# Patient Record
Sex: MALE | Race: WHITE
[De-identification: names, ages, dates, MRNs, and addresses within clinical notes are randomized per-mention and may not be internally consistent; named-entity substitution may affect disease eponyms.]

---

## 2017-05-29 ENCOUNTER — HOSPITAL ENCOUNTER (EMERGENCY)
Dept: HOSPITAL 17 - NEPC | Age: 52
Discharge: HOME | End: 2017-05-29
Payer: COMMERCIAL

## 2017-05-29 VITALS — DIASTOLIC BLOOD PRESSURE: 95 MMHG | SYSTOLIC BLOOD PRESSURE: 192 MMHG

## 2017-05-29 VITALS
DIASTOLIC BLOOD PRESSURE: 111 MMHG | TEMPERATURE: 97.5 F | RESPIRATION RATE: 18 BRPM | SYSTOLIC BLOOD PRESSURE: 177 MMHG | HEART RATE: 72 BPM | OXYGEN SATURATION: 100 %

## 2017-05-29 VITALS — BODY MASS INDEX: 22.49 KG/M2 | WEIGHT: 143.3 LBS | HEIGHT: 67 IN

## 2017-05-29 DIAGNOSIS — E78.5: ICD-10-CM

## 2017-05-29 DIAGNOSIS — J44.9: ICD-10-CM

## 2017-05-29 DIAGNOSIS — Z79.82: ICD-10-CM

## 2017-05-29 DIAGNOSIS — I10: ICD-10-CM

## 2017-05-29 DIAGNOSIS — Q85.00: ICD-10-CM

## 2017-05-29 DIAGNOSIS — G89.29: ICD-10-CM

## 2017-05-29 DIAGNOSIS — L03.111: Primary | ICD-10-CM

## 2017-05-29 PROCEDURE — 96372 THER/PROPH/DIAG INJ SC/IM: CPT

## 2017-05-29 PROCEDURE — 99284 EMERGENCY DEPT VISIT MOD MDM: CPT

## 2017-05-29 NOTE — PD
HPI


Chief Complaint:  Skin Problem


Time Seen by Provider:  07:02


Travel History


International Travel<30 days:  No


Contact w/Intl Traveler<30days:  No


Traveled to known affect area:  No





History of Present Illness


HPI


52-year-old male presents emergency department complaining of pain and swelling 

under his right arm for the past 2-3 days, worsening.  There is erythema 

redness warmth and tenderness.  His had one abscess there about a year or so 

ago.  It had multiple skin infections before.  No fevers chills, nausea vomiting

, or other evidence of systemic infection.  No other complaints.





History


Past Medical History


*** Narrative Medical


Chronic back pain


COPD


Neurofibromatosis


Hypertension 


hyperlipidemia


CAD





Social History


Alcohol Use:  No (HX OF USE)


Tobacco Use:  Yes (1 PACK DAILY)





Allergies-Medications


(Allergen,Severity, Reaction):  


Coded Allergies:  


     Codeine (Verified  Adverse Reaction, Severe, NAUSEA & VOMITING, 1/13/16)


     *MDRO Multi-Drug Resistant Organism (Verified  Adverse Reaction, Unknown, 1 /13/16)


 MRSA 2007 (finger), 1/2008 (hand), 3/2008 (finger), 11/2011


 (scrotum), 10/2014 (arm), 10/2015 (arm)


Reported Meds & Prescriptions





Reported Meds & Active Scripts


Active


Levothyroxine 25 mcg (Levothyroxine Sodium) 25 Mcg Tab 25 Mcg PO DAILY 


Metoprolol Tartrate 25 mg (Metoprolol Tartrate) 25 Mg Tab 25 Mg PO Q12HR 


Lipitor 10 mg tab (Atorvastatin) 10 Mg Tab 10 Mg PO DAILY 


Gabapentin 600 Mg Tab 600 Mg PO TID 


Ventolin Hfa (Albuterol Sulfate) 60 Puff/8 Gm Aero 2 Puff INH Q4H PRN 30 Days


Reported


Aspir-81 (Aspirin) 81 Mg Tab 81 Mg PO BID 








Review of Systems


Except as stated in HPI:  all other systems reviewed are Neg





Physical Exam


Narrative


GENERAL: Well-appearing 52-year-old man, no acute distress.


SKIN: Warm and dry.


CARDIOVASCULAR: Warm and well perfused.


RESPIRATORY: Normal rate and effort.


MUSCULOSKELETAL: Focus examination the right axilla reveals some erythema 

fullness to the right axilla.  There is 2 pustules.  There is some induration 

but no significant organization, or fluctuance.


NEUROLOGICAL: Awake and alert.  No gross deficits.





Data


Data


Last Documented VS





Vital Signs








  Date Time  Temp Pulse Resp B/P Pulse Ox O2 Delivery O2 Flow Rate FiO2


 


5/29/17 06:51   18     


 


5/29/17 06:35 97.5 72  177/111 100   








Orders





 Hydromorphone Pf Inj (Dilaudid Pf Inj) (5/29/17 07:15)


Lidocai-Epi 1%-1:100,000 Inj (Xylocaine- (5/29/17 07:15)


Ed Poc Ultrasound (5/29/17 )


Sulfamet-Trimeth Ds 800-160 Mg (Bactrim (5/29/17 08:00)








MDM


Medical Decision Making


Medical Screen Exam Complete:  Yes


Emergency Medical Condition:  Yes


Differential Diagnosis


Abscess, phlegmon, cellulitis, other


Narrative Course


Medical decision making





51-year-old male presents emergency Department with soft tissue infection under 

his right axilla.  There is no clear fluctuance.  Examined under ultrasound 

shows no drainable collections.  Patient will be placed on antibiotics, 

reassess it progresses.





Procedures


**Procedure Narrative**


Point of care ultrasound: Focused soft tissue ultrasounds perform a me at the 

bedside for the purpose of evaluating for drainable fluid collections.  No 

drainable fluid collections were identified in the right axilla.





Diagnosis





 Primary Impression:  


 Cellulitis of right axilla


Patient Instructions:  Narcotic given in the ED





***Additional Instructions:


Take antibiotics as prescribed.





Follow-up with your primary doctor in 2-3 days.





Return to the emergency department for any new or worsening symptoms.


***Med/Other Pt SpecificInfo:  Prescription(s) given


Scripts


Sulfamethoxazole-Trimethoprim (Bactrim DS)800-160 Mg Tab1 Tab PO BID  7 Days


   Prov:Justin Collazo MD         5/29/17


Disposition:  01 DISCHARGE HOME


Condition:  Stable








Justin Collazo MD May 29, 2017 07:54

## 2017-09-04 ENCOUNTER — HOSPITAL ENCOUNTER (INPATIENT)
Dept: HOSPITAL 17 - NEPE | Age: 52
LOS: 2 days | Discharge: HOME | DRG: 682 | End: 2017-09-06
Attending: HOSPITALIST | Admitting: HOSPITALIST
Payer: MEDICAID

## 2017-09-04 VITALS
DIASTOLIC BLOOD PRESSURE: 71 MMHG | HEART RATE: 72 BPM | TEMPERATURE: 96.4 F | OXYGEN SATURATION: 95 % | RESPIRATION RATE: 16 BRPM | SYSTOLIC BLOOD PRESSURE: 147 MMHG

## 2017-09-04 VITALS
DIASTOLIC BLOOD PRESSURE: 82 MMHG | HEART RATE: 70 BPM | TEMPERATURE: 98.4 F | SYSTOLIC BLOOD PRESSURE: 134 MMHG | OXYGEN SATURATION: 96 % | RESPIRATION RATE: 18 BRPM

## 2017-09-04 VITALS
SYSTOLIC BLOOD PRESSURE: 118 MMHG | TEMPERATURE: 98 F | HEART RATE: 77 BPM | DIASTOLIC BLOOD PRESSURE: 66 MMHG | RESPIRATION RATE: 28 BRPM

## 2017-09-04 VITALS
DIASTOLIC BLOOD PRESSURE: 60 MMHG | TEMPERATURE: 98.1 F | SYSTOLIC BLOOD PRESSURE: 117 MMHG | HEART RATE: 86 BPM | RESPIRATION RATE: 28 BRPM | OXYGEN SATURATION: 89 %

## 2017-09-04 VITALS — WEIGHT: 163.58 LBS | BODY MASS INDEX: 26.29 KG/M2 | HEIGHT: 66 IN

## 2017-09-04 VITALS — OXYGEN SATURATION: 100 %

## 2017-09-04 VITALS — OXYGEN SATURATION: 97 %

## 2017-09-04 DIAGNOSIS — F12.10: ICD-10-CM

## 2017-09-04 DIAGNOSIS — T67.5XXA: ICD-10-CM

## 2017-09-04 DIAGNOSIS — K21.9: ICD-10-CM

## 2017-09-04 DIAGNOSIS — F17.210: ICD-10-CM

## 2017-09-04 DIAGNOSIS — E87.3: ICD-10-CM

## 2017-09-04 DIAGNOSIS — Z99.3: ICD-10-CM

## 2017-09-04 DIAGNOSIS — E86.0: ICD-10-CM

## 2017-09-04 DIAGNOSIS — M54.9: ICD-10-CM

## 2017-09-04 DIAGNOSIS — E03.9: ICD-10-CM

## 2017-09-04 DIAGNOSIS — J44.9: ICD-10-CM

## 2017-09-04 DIAGNOSIS — R11.2: ICD-10-CM

## 2017-09-04 DIAGNOSIS — I25.10: ICD-10-CM

## 2017-09-04 DIAGNOSIS — R27.8: ICD-10-CM

## 2017-09-04 DIAGNOSIS — G92: ICD-10-CM

## 2017-09-04 DIAGNOSIS — N17.9: Primary | ICD-10-CM

## 2017-09-04 DIAGNOSIS — E87.1: ICD-10-CM

## 2017-09-04 DIAGNOSIS — F32.9: ICD-10-CM

## 2017-09-04 DIAGNOSIS — F11.10: ICD-10-CM

## 2017-09-04 DIAGNOSIS — Q85.00: ICD-10-CM

## 2017-09-04 DIAGNOSIS — G89.29: ICD-10-CM

## 2017-09-04 DIAGNOSIS — I10: ICD-10-CM

## 2017-09-04 LAB
ALP SERPL-CCNC: 103 U/L (ref 45–117)
ALT SERPL-CCNC: 16 U/L (ref 12–78)
ANION GAP SERPL CALC-SCNC: 8 MEQ/L (ref 5–15)
APTT BLD: 28.8 SEC (ref 24.3–30.1)
AST SERPL-CCNC: 12 U/L (ref 15–37)
BASE EXCESS BLD CALC-SCNC: 8.9 MMOL/L (ref -2–2)
BASOPHILS # BLD AUTO: 0.1 TH/MM3 (ref 0–0.2)
BASOPHILS NFR BLD: 0.6 % (ref 0–2)
BENZODIAZEPINES PNL UR: 85 % (ref 90–100)
BILIRUB SERPL-MCNC: 0.4 MG/DL (ref 0.2–1)
BLOOD GAS CARBOXYHEMOGLOBIN: 8.4 % (ref 0–4)
BLOOD GAS HCO3: 33 MMOL/L (ref 22–26)
BLOOD GAS OXYGEN CONTENT: 13.2 VOL % (ref 12–20)
BLOOD GAS PCO2: 45 MMHG (ref 38–42)
BUN SERPL-MCNC: 78 MG/DL (ref 7–18)
CHLORIDE SERPL-SCNC: 86 MEQ/L (ref 98–107)
CK SERPL-CCNC: 28 U/L (ref 39–308)
COLOR UR: (no result)
COMMENT (UR): (no result)
CRITICAL VALUE: YES
CULTURE IF INDICATED: (no result)
DRAW SITE: (no result)
EOSINOPHIL # BLD: 0.3 TH/MM3 (ref 0–0.4)
EOSINOPHIL NFR BLD: 3.2 % (ref 0–4)
ERYTHROCYTE [DISTWIDTH] IN BLOOD BY AUTOMATED COUNT: 14 % (ref 11.6–17.2)
GFR SERPLBLD BASED ON 1.73 SQ M-ARVRAT: 10 ML/MIN (ref 89–?)
GLUCOSE UR STRIP-MCNC: (no result) MG/DL
HCO3 BLD-SCNC: 35.2 MEQ/L (ref 21–32)
HCT VFR BLD CALC: 36.8 % (ref 39–51)
HEMO FLAGS: (no result)
HGB UR QL STRIP: (no result)
INR PPP: 0.9 RATIO
KETONES UR STRIP-MCNC: (no result) MG/DL
LYMPHOCYTES # BLD AUTO: 2.9 TH/MM3 (ref 1–4.8)
LYMPHOCYTES NFR BLD AUTO: 27.5 % (ref 9–44)
MAGNESIUM SERPL-MCNC: 2.9 MG/DL (ref 1.5–2.5)
MCH RBC QN AUTO: 30.8 PG (ref 27–34)
MCHC RBC AUTO-ENTMCNC: 34.1 % (ref 32–36)
MCV RBC AUTO: 90.2 FL (ref 80–100)
METHGB MFR BLDA: 0.7 % (ref 0–2)
MONOCYTES NFR BLD: 14.2 % (ref 0–8)
NEUTROPHILS # BLD AUTO: 5.8 TH/MM3 (ref 1.8–7.7)
NEUTROPHILS NFR BLD AUTO: 54.5 % (ref 16–70)
NITRITE UR QL STRIP: (no result)
NUMBER OF ARTERIAL PUNCTURES: 1
OXYGEN DEVICE: (no result)
PLATELET # BLD: 325 TH/MM3 (ref 150–450)
PO2 BLD: 61 MMHG (ref 61–120)
POTASSIUM SERPL-SCNC: 4.5 MEQ/L (ref 3.5–5.1)
PROTHROMBIN TIME: 9.6 SEC (ref 9.8–11.6)
RBC # BLD AUTO: 4.08 MIL/MM3 (ref 4.5–5.9)
SALICYLATES SERPL-MCNC: 11.1 G/DL (ref 12–16)
SODIUM SERPL-SCNC: 129 MEQ/L (ref 136–145)
SP GR UR STRIP: 1.01 (ref 1–1.03)
STAT: YES
TEMP CORR TO: 98.6
WBC # BLD AUTO: 10.6 TH/MM3 (ref 4–11)

## 2017-09-04 PROCEDURE — 74176 CT ABD & PELVIS W/O CONTRAST: CPT

## 2017-09-04 PROCEDURE — 96361 HYDRATE IV INFUSION ADD-ON: CPT

## 2017-09-04 PROCEDURE — 82805 BLOOD GASES W/O2 SATURATION: CPT

## 2017-09-04 PROCEDURE — 80069 RENAL FUNCTION PANEL: CPT

## 2017-09-04 PROCEDURE — 85730 THROMBOPLASTIN TIME PARTIAL: CPT

## 2017-09-04 PROCEDURE — 82570 ASSAY OF URINE CREATININE: CPT

## 2017-09-04 PROCEDURE — 87040 BLOOD CULTURE FOR BACTERIA: CPT

## 2017-09-04 PROCEDURE — 80171 DRUG SCREEN QUANT GABAPENTIN: CPT

## 2017-09-04 PROCEDURE — 76775 US EXAM ABDO BACK WALL LIM: CPT

## 2017-09-04 PROCEDURE — 96375 TX/PRO/DX INJ NEW DRUG ADDON: CPT

## 2017-09-04 PROCEDURE — 85610 PROTHROMBIN TIME: CPT

## 2017-09-04 PROCEDURE — 83880 ASSAY OF NATRIURETIC PEPTIDE: CPT

## 2017-09-04 PROCEDURE — 94640 AIRWAY INHALATION TREATMENT: CPT

## 2017-09-04 PROCEDURE — 36600 WITHDRAWAL OF ARTERIAL BLOOD: CPT

## 2017-09-04 PROCEDURE — 71010: CPT

## 2017-09-04 PROCEDURE — 94664 DEMO&/EVAL PT USE INHALER: CPT

## 2017-09-04 PROCEDURE — 80053 COMPREHEN METABOLIC PANEL: CPT

## 2017-09-04 PROCEDURE — 93005 ELECTROCARDIOGRAM TRACING: CPT

## 2017-09-04 PROCEDURE — 80307 DRUG TEST PRSMV CHEM ANLYZR: CPT

## 2017-09-04 PROCEDURE — 82550 ASSAY OF CK (CPK): CPT

## 2017-09-04 PROCEDURE — 81001 URINALYSIS AUTO W/SCOPE: CPT

## 2017-09-04 PROCEDURE — 84300 ASSAY OF URINE SODIUM: CPT

## 2017-09-04 PROCEDURE — 96374 THER/PROPH/DIAG INJ IV PUSH: CPT

## 2017-09-04 PROCEDURE — 83735 ASSAY OF MAGNESIUM: CPT

## 2017-09-04 PROCEDURE — 83605 ASSAY OF LACTIC ACID: CPT

## 2017-09-04 PROCEDURE — 85025 COMPLETE CBC W/AUTO DIFF WBC: CPT

## 2017-09-04 PROCEDURE — 84484 ASSAY OF TROPONIN QUANT: CPT

## 2017-09-04 PROCEDURE — 84443 ASSAY THYROID STIM HORMONE: CPT

## 2017-09-04 PROCEDURE — 51702 INSERT TEMP BLADDER CATH: CPT

## 2017-09-04 RX ADMIN — HEPARIN SODIUM SCH UNITS: 10000 INJECTION, SOLUTION INTRAVENOUS; SUBCUTANEOUS at 16:50

## 2017-09-04 RX ADMIN — PHENYTOIN SODIUM SCH MLS/HR: 50 INJECTION INTRAMUSCULAR; INTRAVENOUS at 16:50

## 2017-09-04 RX ADMIN — IPRATROPIUM BROMIDE AND ALBUTEROL SULFATE SCH AMPULE: .5; 3 SOLUTION RESPIRATORY (INHALATION) at 12:13

## 2017-09-04 RX ADMIN — STANDARDIZED SENNA CONCENTRATE AND DOCUSATE SODIUM SCH TAB: 8.6; 5 TABLET, FILM COATED ORAL at 20:05

## 2017-09-04 RX ADMIN — IPRATROPIUM BROMIDE AND ALBUTEROL SULFATE SCH AMPULE: .5; 3 SOLUTION RESPIRATORY (INHALATION) at 12:14

## 2017-09-04 RX ADMIN — METOPROLOL TARTRATE SCH MG: 25 TABLET, FILM COATED ORAL at 20:05

## 2017-09-04 RX ADMIN — IPRATROPIUM BROMIDE AND ALBUTEROL SULFATE SCH AMPULE: .5; 3 SOLUTION RESPIRATORY (INHALATION) at 22:00

## 2017-09-04 RX ADMIN — ACETAMINOPHEN PRN MG: 325 TABLET ORAL at 23:16

## 2017-09-04 RX ADMIN — PHENYTOIN SODIUM SCH MLS/HR: 50 INJECTION INTRAMUSCULAR; INTRAVENOUS at 20:05

## 2017-09-04 RX ADMIN — Medication SCH ML: at 20:00
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
## 2017-09-04 NOTE — RADRPT
EXAM DATE/TIME:  09/04/2017 16:23 

 

[facility] COMPARISON:     

No previous studies available for comparison.

        

 

 

INDICATIONS :     

Increasd BUN/Creatnine. 

                     

 

MEDICAL HISTORY :     

Hypertension. Gastroesophageal reflux disease.   Numbness. Dizziness. Depression. Anxiety. Claustroph
obia. 

 

SURGICAL HISTORY :     

Tonsillectomy. Appendectomy.   Back surgery. Testicular surgery. Left foot surgery. Skin graft.

 

ENCOUNTER:     

Initial

 

ACUITY:     

1 day

 

PAIN SCORE:     

0/10

 

LOCATION:     

Bilateral flank 

MEASUREMENTS:     

 

RIGHT KIDNEY:     

10.4 x 4.3 x 5.0 cm

 

LEFT KIDNEY:     

11.6 x 5.2 x 4.8 cm

 

FINDINGS:     

 

RIGHT KIDNEY:     

Renal cortex is normal in thickness and echotexture.  No hydronephrosis, stone, or mass.  

 

LEFT KIDNEY:     

Renal cortex is normal in thickness and echotexture.  No hydronephrosis, stone, or mass.  

 

BLADDER:     

A Trejo catheter is present in the bladder. There is no focal abnormality.

 

CONCLUSION:     The kidneys are unremarkable in appearance with no evidence of hydronephrosis. 

 

 

 Clint Maldonado MD on September 04, 2017 at 17:13           

Board Certified Radiologist.

 This report was verified electronically.
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
## 2017-09-04 NOTE — RADRPT
EXAM DATE/TIME:  09/04/2017 12:50 

 

[facility] COMPARISON:     

CHEST SINGLE AP, October 08, 2015, 10:47.

 

                     

INDICATIONS :     

Possible seizures, not feeling right.

                     

 

MEDICAL HISTORY :     

None.          

 

SURGICAL HISTORY :     

None.   

 

ENCOUNTER:     

Initial                                        

 

ACUITY:     

1 day      

 

PAIN SCORE:     

Non-responsive.

 

LOCATION:     

Bilateral chest 

 

FINDINGS:     

A single view of the chest demonstrates the lungs to be symmetrically aerated without evidence of mas
s, infiltrate or effusion.  The cardiomediastinal contours are unremarkable.  Osseous structures are 
intact.

 

CONCLUSION:     No acute disease.  

 

 

 

 Humphrey Bartholomew MD FACR on September 04, 2017 at 13:02           

Board Certified Radiologist.

 This report was verified electronically.

## 2017-09-04 NOTE — RADRPT
EXAM DATE/TIME:  09/04/2017 19:05 

 

[facility] COMPARISON:     

CT ABDOMEN & PELVIS W CONTRAST, October 12, 2010, 18:45.

 

 

INDICATIONS :     

Decreased appetite with nausea past week.

                  

 

ORAL CONTRAST:      

Prescribed oral contrast ingested.

                  

 

RADIATION DOSE:     

13.46 CTDIvol (mGy) 

 

 

MEDICAL HISTORY :     

Cardiovascular disease. Hypertension. Chronic obstructive pulmonary disease.GERD

 

SURGICAL HISTORY :      

Appendectomy. 

 

ENCOUNTER:      

Initial

 

ACUITY:      

1 week

 

PAIN SCALE:      

0/10

 

LOCATION:         

abdomen

 

TECHNIQUE:     

Volumetric scanning of the abdomen and pelvis was performed.  Using automated exposure control and ad
justment of the mA and/or kV according to patient size, radiation dose was kept as low as reasonably 
achievable to obtain optimal diagnostic quality images.  DICOM format image data is available electro
nically for review and comparison.  

 

FINDINGS:     

 

LOWER LUNGS:     

The visualized lower lungs are clear.

 

LIVER:     

Homogeneous density without lesion.  There is no dilation of the biliary tree.  No calcified gallston
es.

 

SPLEEN:     

Normal size without lesion.

 

PANCREAS:     

Within normal limits. 

 

KIDNEYS:     

Normal in size and shape.  There is no mass, stone, or hydronephrosis.

 

ADRENAL GLANDS:     

Within normal limits.

 

VASCULAR:     

There is no aortic aneurysm.

 

BOWEL/MESENTERY:     

The stomach, small bowel, and colon demonstrate no acute abnormality.  There is no free intraperitone
al air or fluid. 

 

ABDOMINAL WALL:     

Within normal limits.

 

RETROPERITONEUM:     

There is no lymphadenopathy.

 

BLADDER:     

A Trejo catheter is present with small air-fluid level.

 

REPRODUCTIVE:     

Within normal limits.

 

INGUINAL:     

There is no lymphadenopathy or hernia.

 

MUSCULOSKELETAL:     

Within normal limits for patient age.

 

CONCLUSION:     

1. Unremarkable bowel gas pattern with no inflammatory change or obstruction.

2. Unremarkable gallbladder.

 

 

 

 Clint Maldonado MD on September 04, 2017 at 19:24           

Board Certified Radiologist.

 This report was verified electronically.

## 2017-09-05 VITALS
HEART RATE: 80 BPM | DIASTOLIC BLOOD PRESSURE: 82 MMHG | RESPIRATION RATE: 17 BRPM | TEMPERATURE: 96.8 F | SYSTOLIC BLOOD PRESSURE: 142 MMHG | OXYGEN SATURATION: 100 %

## 2017-09-05 VITALS — OXYGEN SATURATION: 98 %

## 2017-09-05 VITALS
SYSTOLIC BLOOD PRESSURE: 114 MMHG | OXYGEN SATURATION: 100 % | TEMPERATURE: 96.3 F | RESPIRATION RATE: 17 BRPM | DIASTOLIC BLOOD PRESSURE: 65 MMHG | HEART RATE: 72 BPM

## 2017-09-05 VITALS
HEART RATE: 73 BPM | SYSTOLIC BLOOD PRESSURE: 137 MMHG | TEMPERATURE: 98 F | DIASTOLIC BLOOD PRESSURE: 72 MMHG | RESPIRATION RATE: 17 BRPM | OXYGEN SATURATION: 97 %

## 2017-09-05 VITALS
DIASTOLIC BLOOD PRESSURE: 67 MMHG | OXYGEN SATURATION: 100 % | HEART RATE: 76 BPM | RESPIRATION RATE: 16 BRPM | SYSTOLIC BLOOD PRESSURE: 118 MMHG | TEMPERATURE: 96.4 F

## 2017-09-05 VITALS
HEART RATE: 73 BPM | OXYGEN SATURATION: 99 % | SYSTOLIC BLOOD PRESSURE: 113 MMHG | TEMPERATURE: 96.6 F | DIASTOLIC BLOOD PRESSURE: 60 MMHG | RESPIRATION RATE: 18 BRPM

## 2017-09-05 VITALS — OXYGEN SATURATION: 99 %

## 2017-09-05 LAB
ALP SERPL-CCNC: 84 U/L (ref 45–117)
ALT SERPL-CCNC: 13 U/L (ref 12–78)
ANION GAP SERPL CALC-SCNC: 5 MEQ/L (ref 5–15)
AST SERPL-CCNC: 10 U/L (ref 15–37)
BASOPHILS # BLD AUTO: 0 TH/MM3 (ref 0–0.2)
BASOPHILS NFR BLD: 0.2 % (ref 0–2)
BILIRUB SERPL-MCNC: 0.2 MG/DL (ref 0.2–1)
BUN SERPL-MCNC: 59 MG/DL (ref 7–18)
CHLORIDE SERPL-SCNC: 100 MEQ/L (ref 98–107)
EOSINOPHIL # BLD: 0 TH/MM3 (ref 0–0.4)
EOSINOPHIL NFR BLD: 0.1 % (ref 0–4)
ERYTHROCYTE [DISTWIDTH] IN BLOOD BY AUTOMATED COUNT: 13.5 % (ref 11.6–17.2)
GFR SERPLBLD BASED ON 1.73 SQ M-ARVRAT: 22 ML/MIN (ref 89–?)
HCO3 BLD-SCNC: 30.6 MEQ/L (ref 21–32)
HCT VFR BLD CALC: 32.5 % (ref 39–51)
HEMO FLAGS: (no result)
LYMPHOCYTES # BLD AUTO: 1.3 TH/MM3 (ref 1–4.8)
LYMPHOCYTES NFR BLD AUTO: 15.7 % (ref 9–44)
MCH RBC QN AUTO: 31.5 PG (ref 27–34)
MCHC RBC AUTO-ENTMCNC: 34.3 % (ref 32–36)
MCV RBC AUTO: 91.8 FL (ref 80–100)
MONOCYTES NFR BLD: 11.1 % (ref 0–8)
NEUTROPHILS # BLD AUTO: 5.8 TH/MM3 (ref 1.8–7.7)
NEUTROPHILS NFR BLD AUTO: 72.9 % (ref 16–70)
PLATELET # BLD: 261 TH/MM3 (ref 150–450)
POTASSIUM SERPL-SCNC: 4.8 MEQ/L (ref 3.5–5.1)
RBC # BLD AUTO: 3.54 MIL/MM3 (ref 4.5–5.9)
SODIUM SERPL-SCNC: 136 MEQ/L (ref 136–145)
WBC # BLD AUTO: 8 TH/MM3 (ref 4–11)

## 2017-09-05 RX ADMIN — IPRATROPIUM BROMIDE AND ALBUTEROL SULFATE SCH AMPULE: .5; 3 SOLUTION RESPIRATORY (INHALATION) at 10:59

## 2017-09-05 RX ADMIN — METOPROLOL TARTRATE SCH MG: 25 TABLET, FILM COATED ORAL at 08:20

## 2017-09-05 RX ADMIN — PHENYTOIN SODIUM SCH MLS/HR: 50 INJECTION INTRAMUSCULAR; INTRAVENOUS at 11:13

## 2017-09-05 RX ADMIN — IPRATROPIUM BROMIDE AND ALBUTEROL SULFATE SCH AMPULE: .5; 3 SOLUTION RESPIRATORY (INHALATION) at 16:45

## 2017-09-05 RX ADMIN — HEPARIN SODIUM SCH UNITS: 10000 INJECTION, SOLUTION INTRAVENOUS; SUBCUTANEOUS at 03:23

## 2017-09-05 RX ADMIN — PHENYTOIN SODIUM SCH MLS/HR: 50 INJECTION INTRAMUSCULAR; INTRAVENOUS at 03:28

## 2017-09-05 RX ADMIN — STANDARDIZED SENNA CONCENTRATE AND DOCUSATE SODIUM SCH TAB: 8.6; 5 TABLET, FILM COATED ORAL at 08:20

## 2017-09-05 RX ADMIN — ACETAMINOPHEN PRN MG: 325 TABLET ORAL at 20:26

## 2017-09-05 RX ADMIN — IPRATROPIUM BROMIDE AND ALBUTEROL SULFATE SCH AMPULE: .5; 3 SOLUTION RESPIRATORY (INHALATION) at 04:41

## 2017-09-05 RX ADMIN — PHENYTOIN SODIUM SCH MLS/HR: 50 INJECTION INTRAMUSCULAR; INTRAVENOUS at 17:41

## 2017-09-05 RX ADMIN — CALCIUM CARBONATE (ANTACID) CHEW TAB 500 MG SCH MG: 500 CHEW TAB at 20:26

## 2017-09-05 RX ADMIN — ACETAMINOPHEN PRN MG: 325 TABLET ORAL at 08:22

## 2017-09-05 RX ADMIN — IPRATROPIUM BROMIDE AND ALBUTEROL SULFATE SCH AMPULE: .5; 3 SOLUTION RESPIRATORY (INHALATION) at 21:36

## 2017-09-05 RX ADMIN — PANTOPRAZOLE SODIUM SCH MG: 20 TABLET, DELAYED RELEASE ORAL at 08:19

## 2017-09-05 RX ADMIN — Medication SCH ML: at 08:22

## 2017-09-05 RX ADMIN — CLOPIDOGREL BISULFATE SCH MG: 75 TABLET, FILM COATED ORAL at 08:19

## 2017-09-05 RX ADMIN — STANDARDIZED SENNA CONCENTRATE AND DOCUSATE SODIUM SCH TAB: 8.6; 5 TABLET, FILM COATED ORAL at 20:27

## 2017-09-05 RX ADMIN — HEPARIN SODIUM SCH UNITS: 10000 INJECTION, SOLUTION INTRAVENOUS; SUBCUTANEOUS at 15:35

## 2017-09-05 RX ADMIN — ASPIRIN SCH MG: 81 TABLET ORAL at 08:19

## 2017-09-05 RX ADMIN — METOPROLOL TARTRATE SCH MG: 25 TABLET, FILM COATED ORAL at 20:26

## 2017-09-05 RX ADMIN — Medication SCH ML: at 20:27

## 2017-09-05 NOTE — EKG
Date Performed: 09/04/2017       Time Performed: 11:50:45

 

PTAGE:      52 years

 

EKG:      Sinus rhythm 

 

 SEPTAL MYOCARDIAL INFARCTION ABNORMAL ECG Compared to prior tracing no significant change 

 

 PREVIOUS TRACING            : 09/04/2017 11.50

 

DOCTOR:   Diaz Ch  Interpretating Date/Time  09/05/2017 13:46:47

## 2017-09-05 NOTE — HHI.NPPN
Subjective


History of Present Illness


52-year-old with acute renal failure and dehydration





Review of Systems


General


Constitutional:  Fatigue





Objective Data


Data











 9/5/17 9/6/17





 18:59 06:59


 


Intake Total 1440 ml 


 


Output Total 1200 ml 


 


Balance 240 ml 


 


  


 


Intake Oral 1440 ml 


 


Output Urine Total 1200 ml 


 


# Bowel Movements 0 











Vital Signs








  Date Time  Temp Pulse Resp B/P (MAP) Pulse Ox O2 Delivery O2 Flow Rate FiO2


 


9/5/17 17:36     98   


 


9/5/17 16:00 96.4 76 16 118/67 (84) 100   


 


9/5/17 12:00 98.0 73 17 137/72 (93) 97   


 


9/5/17 11:02     99   


 


9/5/17 09:22   18     


 


9/5/17 08:00 96.3 72 17 114/65 (81) 100   


 


9/5/17 00:00 96.6 73 18 113/60 (77) 99   


 


9/4/17 22:03     97 Nasal Cannula 2.00 


 


9/4/17 20:00 98.4 70 18 134/82 (99) 96   








-:  


9/5/17 0633                                                                    

            9/5/17 0633








Physical Exam


General


Appearance:  Well Developed, Well Nourished





Neck


Neck Exam:  Neck Supple





Pulmonary


Resp Exam:  Clear Bilaterally, Breath Sounds Equal





Cardiology


CV Exam:  Regular, Normal Sinus Rhythm





Gastrointestinal/Abdomen


GI Exam:  Soft, Non-Tender, Bowel Sounds Present





Extremeties


Extremities Exam:  No Edema





Neurologic


Neuro Exam:  Alert, Awake





Assessment/Plan


Problem List:  


(1) Renal failure


ICD Codes:  N19 - Unspecified kidney failure


Status:  Acute


Plan:  Agree with IV hydration severe dehydration was resolving


Ultrasound of the kidney was unremarkable


At this point that he is recovering and she can be discharged from nephrology 

point of view if creatinine continues to decline


We'll follow as needed





(2) H/O neurofibromatosis


ICD Codes:  Q85.00 - History of neurofibromatosis


Status:  Chronic


(3) Chronic pain


ICD Codes:  G89.29 - Other chronic pain


Status:  Chronic


Plan:  Uses marijuana














Agusto March MD Sep 5, 2017 19:02

## 2017-09-05 NOTE — HHI.PR
Subjective


Remarks


Patient seen this afternoon around 2:30 PM.  Says he is feeling better.  Shakes 

of almost resolved.  Denies any nausea today.  He feels that nausea and 

vomiting is secondary to his reflux.  He says he smokes marijuana but refuses 

to discontinue.  He says he thinks the marijuana causes nausea by increasing 

his appetite, then he feels full and nauseous.  We discussed the possibility of 

narcotics exacerbating pre-existing gastroparesis, and I recommended complete 

cessation.denies any chest pain or shortness of breath.





Objective





Vital Signs








  Date Time  Temp Pulse Resp B/P (MAP) Pulse Ox O2 Delivery O2 Flow Rate FiO2


 


9/5/17 17:36     98   


 


9/5/17 16:00 96.4 76 16 118/67 (84) 100   


 


9/5/17 12:00 98.0 73 17 137/72 (93) 97   


 


9/5/17 11:02     99   


 


9/5/17 09:22   18     


 


9/5/17 08:00 96.3 72 17 114/65 (81) 100   


 


9/5/17 00:00 96.6 73 18 113/60 (77) 99   


 


9/4/17 22:03     97 Nasal Cannula 2.00 


 


9/4/17 20:00 98.4 70 18 134/82 (99) 96   














I/O      


 


 9/4/17 9/4/17 9/4/17 9/5/17 9/5/17 9/5/17





 07:00 15:00 23:00 07:00 15:00 23:00


 


Intake Total  1000 ml 240 ml 1130 ml  3350 ml


 


Output Total   850 ml 400 ml  1200 ml


 


Balance  1000 ml -610 ml 730 ml  2150 ml


 


      


 


Intake Oral   240 ml 240 ml  1440 ml


 


IV Total  1000 ml  890 ml  1910 ml


 


Output Urine Total   850 ml 400 ml  1200 ml


 


# Bowel Movements      0








Result Diagram:  


9/5/17 0633                                                                    

            9/5/17 0633





Objective Remarks


GENERAL: sitting up in bed.  Appears comfortable.  Alert and oriented.  No 

tremors today.


SKIN: Warm and dry.


HEAD: Normocephalic.


EYES: No scleral icterus. No injection or drainage. 


NECK: Supple, trachea midline. No JVD.


CARDIOVASCULAR: Regular rate and rhythm without murmurs, gallops, or rubs. 


RESPIRATORY: Breath sounds equal bilaterally. No accessory muscle use.


GASTROINTESTINAL: Abdomen soft, non-tender, nondistended. 


MUSCULOSKELETAL: No cyanosis, or edema. 


BACK: Nontender without obvious deformity. No CVA tenderness.








A/P


Assessment and Plan


== 9/5/17.============


creatinine improving 3.0 today.  Renal ultrasound with no acute findings.  

Continue IV fluids.


Nausea and vomiting on admission has resolved currently.  Continue PPI, Tums.  

Could be marijuana hyperemesis versus gastroparesis exacerbated by narcotics.  

Nevertheless has improved.   recommend complete cessation of both marijuana and 

narcotics.


======================





//Acute kidney injury


-Creatinine 6.13 from suspected normal baseline.


-1 L out over the first hour after Trejo placement.  Possible obstruction.


-Ultrasound ordered and pending.


-Hold lisinopril.  Hold gabapentin.


-Renal ultrasound with no acute findings.


-Nephrology consulted.  





//Abdominal tenderness on exam


Could be secondary to constipation or recent narcotics.  Patient with nausea, 

vomiting, poor by mouth intake likely causing acute kidney injury


-periUmbilical Tenderness to palpation on exam.


CT abdomen with no acute findings.








//Suspected toxic Encephalopathy


-Confused, but nonfocal.


-Patient was on gabapentin, likely supratherapeutic levels now.


-Also with uremia.


-Asterixis secondary to uremia.


-Nonfocal on exam.


-Pain to monitor and Treat acute kidney injury as above.





//both metabolic and Respiratory alkalosis.


-ABG reviewed.


-Hypoxia secondary to smoking, high carboxyhemoglobin content.


likely secondary to dehydration, uremia versus gabapentin toxicity.


-Continue IV fluids.  Continue to monitor.





//Hypovolemic Hyponatremia.  Acute.


-Sodium 126 on admission. 


-Likely secondary to dehydration.  Unknown baseline.


-IV fluids.  Nephrology following.  Appreciate assistance.





//Possible COPD exacerbation


Doubt that this is a standard COPD exacerbation.  X-ray no acute findings.  She 

received nebs, IV steroids in the ER without improvement.  Duo nebs ordered.  

Continue to monitor.





//Coronary artery disease.  Continue home medications of metoprolol, Plavix.





//Depression.  We'll hold home medications until kidney function improves.





//Chronic back pain.  Hold gabapentin secondary to suspected toxicity.  Tylenol 

as necessary





//GERD.  Continue PPI.





//Narcotic abuse


Patient reports feeling his brothers narcotics several days prior to admission.


-Counseling provided.  Patient says he will not do this again.





//Tobacco abuse.  Cessation counseling provided.





//Prophylaxis.  SCDs.  Subcutaneous heparin.


Discharge Planning


if creatinine continues improving, possible discharge tomorrow.











Rashad Son MD Sep 5, 2017 19:23

## 2017-09-06 VITALS
DIASTOLIC BLOOD PRESSURE: 81 MMHG | RESPIRATION RATE: 17 BRPM | TEMPERATURE: 96.2 F | HEART RATE: 71 BPM | SYSTOLIC BLOOD PRESSURE: 128 MMHG | OXYGEN SATURATION: 100 %

## 2017-09-06 VITALS
OXYGEN SATURATION: 99 % | HEART RATE: 73 BPM | TEMPERATURE: 97.2 F | DIASTOLIC BLOOD PRESSURE: 83 MMHG | RESPIRATION RATE: 20 BRPM | SYSTOLIC BLOOD PRESSURE: 123 MMHG

## 2017-09-06 LAB
ANION GAP SERPL CALC-SCNC: 6 MEQ/L (ref 5–15)
BASOPHILS # BLD AUTO: 0.1 TH/MM3 (ref 0–0.2)
BASOPHILS NFR BLD: 0.8 % (ref 0–2)
BUN SERPL-MCNC: 31 MG/DL (ref 7–18)
CHLORIDE SERPL-SCNC: 113 MEQ/L (ref 98–107)
EOSINOPHIL # BLD: 0.2 TH/MM3 (ref 0–0.4)
EOSINOPHIL NFR BLD: 3.3 % (ref 0–4)
ERYTHROCYTE [DISTWIDTH] IN BLOOD BY AUTOMATED COUNT: 13.9 % (ref 11.6–17.2)
GFR SERPLBLD BASED ON 1.73 SQ M-ARVRAT: 49 ML/MIN (ref 89–?)
HCO3 BLD-SCNC: 24.8 MEQ/L (ref 21–32)
HCT VFR BLD CALC: 33.2 % (ref 39–51)
HEMO FLAGS: (no result)
LYMPHOCYTES # BLD AUTO: 2.2 TH/MM3 (ref 1–4.8)
LYMPHOCYTES NFR BLD AUTO: 33.7 % (ref 9–44)
MAGNESIUM SERPL-MCNC: 2.1 MG/DL (ref 1.5–2.5)
MCH RBC QN AUTO: 31.3 PG (ref 27–34)
MCHC RBC AUTO-ENTMCNC: 33.5 % (ref 32–36)
MCV RBC AUTO: 93.6 FL (ref 80–100)
MONOCYTES NFR BLD: 8.2 % (ref 0–8)
NEUTROPHILS # BLD AUTO: 3.5 TH/MM3 (ref 1.8–7.7)
NEUTROPHILS NFR BLD AUTO: 54 % (ref 16–70)
PLATELET # BLD: 243 TH/MM3 (ref 150–450)
POTASSIUM SERPL-SCNC: 4.9 MEQ/L (ref 3.5–5.1)
RBC # BLD AUTO: 3.55 MIL/MM3 (ref 4.5–5.9)
SODIUM SERPL-SCNC: 144 MEQ/L (ref 136–145)
WBC # BLD AUTO: 6.6 TH/MM3 (ref 4–11)

## 2017-09-06 RX ADMIN — PANTOPRAZOLE SODIUM SCH MG: 20 TABLET, DELAYED RELEASE ORAL at 09:37

## 2017-09-06 RX ADMIN — PHENYTOIN SODIUM SCH MLS/HR: 50 INJECTION INTRAMUSCULAR; INTRAVENOUS at 00:19

## 2017-09-06 RX ADMIN — CLOPIDOGREL BISULFATE SCH MG: 75 TABLET, FILM COATED ORAL at 09:36

## 2017-09-06 RX ADMIN — ACETAMINOPHEN PRN MG: 325 TABLET ORAL at 09:38

## 2017-09-06 RX ADMIN — IPRATROPIUM BROMIDE AND ALBUTEROL SULFATE SCH AMPULE: .5; 3 SOLUTION RESPIRATORY (INHALATION) at 04:00

## 2017-09-06 RX ADMIN — IPRATROPIUM BROMIDE AND ALBUTEROL SULFATE SCH AMPULE: .5; 3 SOLUTION RESPIRATORY (INHALATION) at 09:50

## 2017-09-06 RX ADMIN — METOPROLOL TARTRATE SCH MG: 25 TABLET, FILM COATED ORAL at 09:36

## 2017-09-06 RX ADMIN — HEPARIN SODIUM SCH UNITS: 10000 INJECTION, SOLUTION INTRAVENOUS; SUBCUTANEOUS at 03:00

## 2017-09-06 RX ADMIN — ASPIRIN SCH MG: 81 TABLET ORAL at 09:37

## 2017-09-06 RX ADMIN — STANDARDIZED SENNA CONCENTRATE AND DOCUSATE SODIUM SCH TAB: 8.6; 5 TABLET, FILM COATED ORAL at 09:37

## 2017-09-06 RX ADMIN — CALCIUM CARBONATE (ANTACID) CHEW TAB 500 MG SCH MG: 500 CHEW TAB at 09:36

## 2017-09-07 NOTE — HHI.DS
__________________________________________________





Discharge Summary


Admission Date


Sep 4, 2017 at 14:40


Discharge Date:  Sep 6, 2017


Admitting Diagnosis





Renal failure, copd exacerbation, hypoxia





(1) Nausea and vomiting


ICD Code:  R11.2 - Nausea with vomiting, unspecified


(2) Acute kidney injury


ICD Code:  N17.9 - Acute kidney failure, unspecified


(3) Toxic encephalopathy


ICD Code:  G92 - Toxic encephalopathy


(4) Marijuana abuse, continuous


ICD Code:  F12.10 - Marijuana abuse, continuous


Status:  Acute


Procedures


no invasive procedures performed.


Brief History - From Admission


49-year-old male history of COPD, chronic back pain on gabapentin, 

neurofibromatosis, tobacco abuse, hypothyroidism, CAD who presents with 

progressively worsening fatigue, woke up today shaking.  He is a poor 

historian.  He reports decreased appetite, together with nausea and nonbloody 

vomiting over the past week.  He denies any abdominal pain, but has abdominal 

tenderness on exam.  He reports worsening fatigue.  He ports a chronic cough, 

however denies any acute worsening of cough.  He denies any chest pain.


CBC/BMP:  


9/6/17 0429                                                                    

            9/6/17 0429





Significant Findings





Laboratory Tests








Test


  9/4/17


11:45 9/4/17


12:20 9/4/17


13:00 9/4/17


15:47


 


Red Blood Count


  4.08 MIL/MM3


(4.50-5.90) 


  


  


 


 


Hemoglobin


  12.6 GM/DL


(13.0-17.0) 


  


  


 


 


Hematocrit


  36.8 %


(39.0-51.0) 


  


  


 


 


Monocytes (%) (Auto)


  14.2 %


(0.0-8.0) 


  


  


 


 


Monocytes # (Auto)


  1.5 TH/MM3


(0-0.9) 


  


  


 


 


Prothrombin Time


  9.6 SEC


(9.8-11.6) 


  


  


 


 


Blood Urea Nitrogen


  78 MG/DL


(7-18) 


  


  


 


 


Creatinine


  6.13 MG/DL


(0.60-1.30) 


  


  


 


 


Albumin


  3.3 GM/DL


(3.4-5.0) 


  


  


 


 


Magnesium Level


  2.9 MG/DL


(1.5-2.5) 


  


  


 


 


Aspartate Amino Transf


(AST/SGOT) 12 U/L (15-37) 


  


  


  


 


 


Sodium Level


  129 MEQ/L


(136-145) 


  


  


 


 


Chloride Level


  86 MEQ/L


() 


  


  


 


 


Carbon Dioxide Level


  35.2 MEQ/L


(21.0-32.0) 


  


  


 


 


Estimat Glomerular Filtration


Rate 10 ML/MIN


(>89) 


  


  


 


 


Total Creatine Kinase


  31 U/L


() 


  


  


 


 


Troponin I


  LESS THAN 0.02


NG/ML 


  


  


 


 


Blood Gas HCO3


  


  33 mmol/L


(22-26) 


  


 


 


Blood Gas Base Excess


  


  8.9 mmol/L


(-2-2) 


  


 


 


Blood Gas Oxygen Saturation  85 % ()   


 


Arterial Blood pH


  


  7.47


(7.380-7.420) 


  


 


 


Arterial Blood Partial


Pressure CO2 


  45 mmHg


(38-42) 


  


 


 


Arterial Blood


Carboxyhemoglobin 


  8.4 % (0-4) 


  


  


 


 


Blood Gas Hemoglobin


  


  11.1 G/DL


(12.0-16.0) 


  


 


 


Urine Occult Blood   TRACE (NEG)  


 


Urine Cannabinoids Screen    POS (NEG) 


 


Test


  9/4/17


20:38 9/5/17


06:33 9/6/17


04:29 


 


 


Red Blood Count


  


  3.54 MIL/MM3


(4.50-5.90) 3.55 MIL/MM3


(4.50-5.90) 


 


 


Hemoglobin


  


  11.2 GM/DL


(13.0-17.0) 11.1 GM/DL


(13.0-17.0) 


 


 


Hematocrit


  


  32.5 %


(39.0-51.0) 33.2 %


(39.0-51.0) 


 


 


Neutrophils (%) (Auto)


  


  72.9 %


(16.0-70.0) 


  


 


 


Monocytes (%) (Auto)


  


  11.1 %


(0.0-8.0) 8.2 %


(0.0-8.0) 


 


 


Blood Urea Nitrogen


  


  59 MG/DL


(7-18) 31 MG/DL


(7-18) 


 


 


Creatinine


  


  3.06 MG/DL


(0.60-1.30) 1.51 MG/DL


(0.60-1.30) 


 


 


Albumin


  


  2.8 GM/DL


(3.4-5.0) 2.6 GM/DL


(3.4-5.0) 


 


 


Calcium Level


  


  8.2 MG/DL


(8.5-10.1) 7.9 MG/DL


(8.5-10.1) 


 


 


Aspartate Amino Transf


(AST/SGOT) 


  10 U/L (15-37) 


  


  


 


 


Estimat Glomerular Filtration


Rate 


  22 ML/MIN


(>89) 49 ML/MIN


(>89) 


 


 


Phosphorus Level


  


  


  2.2 MG/DL


(2.5-4.9) 


 


 


Chloride Level


  


  


  113 MEQ/L


() 


 








Imaging





Last Impressions








Chest X-Ray 9/4/17 1146 Signed





Impressions: 





 Service Date/Time:  Monday, September 4, 2017 12:50 - CONCLUSION: No acute 





 disease.       Humphrey Bartholomew MD  FACR


 


Renal Ultrasound 9/4/17 0000 Signed





Impressions: 





 Service Date/Time:  Monday, September 4, 2017 16:23 - CONCLUSION: The kidneys 





 are unremarkable in appearance with no evidence of hydronephrosis.     Clint Maldonado MD 


 


Abdomen/Pelvis CT 9/4/17 0000 Signed





Impressions: 





 Service Date/Time:  Monday, September 4, 2017 19:05 - CONCLUSION:  1. 





 Unremarkable bowel gas pattern with no inflammatory change or obstruction. 2. 





 Unremarkable gallbladder.     Clint Maldonado MD 








PE at Discharge


GENERAL: patient sitting up in bed.  Appears comfortable.  Alert and oriented 3


SKIN: Warm and dry.


HEAD: Normocephalic.


EYES: No scleral icterus. No injection or drainage. 


NECK: Supple, trachea midline. No JVD.


CARDIOVASCULAR: Regular rate and rhythm without murmurs, gallops, or rubs. 


RESPIRATORY: Breath sounds equal bilaterally. No accessory muscle use.


GASTROINTESTINAL: Abdomen soft, non-tender, nondistended. 


MUSCULOSKELETAL: No cyanosis, or edema. 


BACK: Nontender without obvious deformity. No CVA tenderness.





Pt update on day of discharge


Patient in the morning prior to discharge.  Says he is feeling all right.  No 

tremors.  No nausea or vomiting.  Denies any chest pain or shortness of breath.

  We discussed staying away from narcotics and marijuana.  Patient says he will 

do so.  Patient agrees to follow up with primary care for repeat labs to 

monitor kidney function.


Hospital Course


Patient was admitted with creatinine of 6.13, likely secondary to dehydration.  

He says he had been outside all day.  Also with bilateral tremors which 

improved with hydration.  Creatinine 1.5 at discharge.  Renal ultrasound as 

above.  Nephrology followed during admission.  Patient will need to follow with 

primary care for repeat labs.  Medications were adjusted secondary to renal 

function.  Gabapentin level still pending suspected toxic systole.  Patient 

also had nausea and vomiting, which resolved by holding narcotics.  Patient 

will avoid narcotics and marijuana as possible exasperates of nausea.





From problem-based summary from most recent progress note, please see below.








== 9/5/17.============


creatinine improving 3.0 today.  Renal ultrasound with no acute findings.  

Continue IV fluids.


Nausea and vomiting on admission has resolved currently.  Continue PPI, Tums.  

Could be marijuana hyperemesis versus gastroparesis exacerbated by narcotics.  

Nevertheless has improved.   recommend complete cessation of both marijuana and 

narcotics.


======================





//Acute kidney injury


-Creatinine 6.13 from suspected normal baseline.


-1 L out over the first hour after Trejo placement.  Possible obstruction.


-Ultrasound ordered and pending.


-Hold lisinopril.  Hold gabapentin.


-Renal ultrasound with no acute findings.


-Nephrology consulted.  





//Abdominal tenderness on exam


Could be secondary to constipation or recent narcotics.  Patient with nausea, 

vomiting, poor by mouth intake likely causing acute kidney injury


-periUmbilical Tenderness to palpation on exam.


CT abdomen with no acute findings.








//Suspected toxic Encephalopathy


-Confused, but nonfocal.


-Patient was on gabapentin, likely supratherapeutic levels now.


-Also with uremia.


-Asterixis secondary to uremia.


-Nonfocal on exam.


-Pain to monitor and Treat acute kidney injury as above.





//both metabolic and Respiratory alkalosis.


-ABG reviewed.


-Hypoxia secondary to smoking, high carboxyhemoglobin content.


likely secondary to dehydration, uremia versus gabapentin toxicity.


-Continue IV fluids.  Continue to monitor.





//Hypovolemic Hyponatremia.  Acute.


-Sodium 126 on admission. 


-Likely secondary to dehydration.  Unknown baseline.


-IV fluids.  Nephrology following.  Appreciate assistance.





//Possible COPD exacerbation


Doubt that this is a standard COPD exacerbation.  X-ray no acute findings.  She 

received nebs, IV steroids in the ER without improvement.  Duo nebs ordered.  

Continue to monitor.





//Coronary artery disease.  Continue home medications of metoprolol, Plavix.





//Depression.  We'll hold home medications until kidney function improves.





//Chronic back pain.  Hold gabapentin secondary to suspected toxicity.  Tylenol 

as necessary





//GERD.  Continue PPI.





//Narcotic abuse


Patient reports feeling his brothers narcotics several days prior to admission.


-Counseling provided.  Patient says he will not do this again.





//Tobacco abuse.  Cessation counseling provided.





//Prophylaxis.  SCDs.  Subcutaneous heparin.


Discharge Planning


if creatinine continues improving, possible discharge tomorrow.


Pt Condition on Discharge:  Good


Discharge Disposition:  Discharge Home


Discharge Time:  > 30 minutes


Discharge Instructions


DIET: Follow Instructions for:  Heart Healthy Diet


Activities you can perform:  Regular-No Restrictions


Follow up Referrals:  


PCP Follow-up - 1 Week





New Medications:  


Esomeprazole DR (Esomeprazole DR) 40 Mg Capdr


40 MG PO DAILY for Reflux, #30 CAP 0 Refills





Rollator Ultra-Light (Rollator Ultra-Light) 1 Mis Mis


EA .ROUTE AS DIRECTED, #1





 


Changed Medications:  


Gabapentin (Gabapentin) 300 Mg Cap


300 MG PO HS for Pain Management, #30 CAP 0 Refills (Changed from: 600 MG; BID; 

60)





 


Continued Medications:  


Albuterol 6.7 GM Inh (Proventil Hfa 6.7 GM Inh) 90 Mcg/Act Aer


1 PUFF INH Q4-6H PRN for SHORTNESS OF BREATH, #1 INHALER 0 Refills





Amitriptyline (Amitriptyline) 25 Mg Tab


25 MG PO HS, TAB





Clopidogrel (Clopidogrel) 75 Mg Tab


75 MG PO DAILY for Blood Clot Prevention, #30 TAB 0 Refills





Metoprolol Tartrate (Metoprolol Tartrate) 25 Mg Tab


25 MG PO BID, #60 TAB 0 Refills





 


Discontinued Medications:  


Cyclobenzaprine (Flexeril) 5 Mg Tab


5 MG PO TID for Muscle Spasm, #90 TAB 0 Refills





Lansoprazole (Lansoprazole) 15 Mg Capdr


15 MG PO DAILY, CAP 0 Refills





Lisinopril (Lisinopril) 20 Mg Tab


20 MG PO DAILY, #30 TAB 0 Refills





Venlafaxine (Effexor) 75 Mg Tab


75 MG PO DAILY, #30 TAB 0 Refills

















Rashad Son MD Sep 7, 2017 00:37

## 2018-05-11 ENCOUNTER — HOSPITAL ENCOUNTER (OUTPATIENT)
Dept: HOSPITAL 17 - HRSP | Age: 53
End: 2018-05-11
Attending: INTERNAL MEDICINE
Payer: MEDICAID

## 2018-05-11 DIAGNOSIS — R06.89: Primary | ICD-10-CM

## 2018-05-11 PROCEDURE — 71046 X-RAY EXAM CHEST 2 VIEWS: CPT

## 2018-05-11 PROCEDURE — 94729 DIFFUSING CAPACITY: CPT

## 2018-05-11 PROCEDURE — 94060 EVALUATION OF WHEEZING: CPT

## 2018-05-11 PROCEDURE — 36600 WITHDRAWAL OF ARTERIAL BLOOD: CPT

## 2018-05-11 PROCEDURE — 82805 BLOOD GASES W/O2 SATURATION: CPT

## 2018-05-11 NOTE — RADRPT
EXAM DATE/TIME:  05/11/2018 14:24 

 

[facility] COMPARISON:     

No previous studies available for comparison.

 

                     

INDICATIONS :     

Shortness of breath. 

                     

 

MEDICAL HISTORY :     

Chronic obstructive pulmonary disease.          

 

SURGICAL HISTORY :     

None.   

 

ENCOUNTER:     

Initial                                        

 

ACUITY:     

2 weeks      

 

PAIN SCORE:     

0/10

 

LOCATION:     

Bilateral chest 

 

FINDINGS:     

PA and lateral views of the chest demonstrate the lungs to be symmetrically aerated without evidence 
of mass, infiltrate or effusion.  Moderate hyperinflation The cardiomediastinal contours are unremark
able.  Osseous structures are intact.

 

CONCLUSION:     

Moderate hyperinflation otherwise negative

 

 

 

 Humphrey Bartholomew MD FACR on May 11, 2018 at 14:41           

Board Certified Radiologist.

 This report was verified electronically.

## 2018-05-14 NOTE — RSPPFT
DATE OF PROCEDURE:  5/11/18



COMMENTS:   



Spirometry with FVC of 1.6, FEV1 of 0.8, FEV1/FVC ratio 54%. A non-significant response to 
acutely inhaled bronchodilator noted.   



IMPRESSION:    

   

1.    Severe airways obstruction.

2.    Non-significant response to acutely inhaled bronchodilator.

3.    Severe reduction in diffusion capacity.

## 2021-08-28 NOTE — PD
HPI


Chief Complaint:  GI Complaint


Time Seen by Provider:  11:39


Travel History


International Travel<30 days:  No


Contact w/Intl Traveler<30days:  No


Traveled to known affect area:  No





History of Present Illness


HPI


Patient is a 52-year-old male with history of COPD, chronic pain, 

neurofibromatosis, tobacco abuse, COPD, hypothyroidism, presents to emergency 

room with multiple complaints.  Patient reports that he has not been feeling 

well since last night, patient reports that he has been wheezing and coughing.  

Reports that he smokes cigarettes and was only able to smoke 2 cigarettes 

today.  Patient reports that last night, he had an episode of vomiting, reports 

no abdominal pain at this time.  Reports that he feels tremulous and shaky all 

over.  Reports overall decreased appetite.  Patient denies any chest pain this 

time, denies any fevers or chills.  Patient denies any sick contacts.





PFSH


Past Medical History


Arthritis:  No


Asthma:  No


Autoimmune Disease:  No


Blood Disorders:  No


Anxiety:  Yes


Depression:  Yes


Heart Rhythm Problems:  No


Cancer:  No


Cardiac Catheterization:  No


Cardiovascular Problems:  Yes (CAD; HTN)


High Cholesterol:  No


Chemotherapy:  No


Chest Pain:  No


Congestive Heart Failure:  No


COPD:  No


Cerebrovascular Accident:  No


Diabetes:  No


Diminished Hearing:  No


Endocrine:  No


GERD:  Yes


Glaucoma:  No


Genitourinary:  No


Headaches:  Yes


Hepatitis:  No


Hiatal Hernia:  No


Hypertension:  No


Immune Disorder:  No


Kidney Stones:  No


Musculoskeletal:  Yes


Neurologic:  Yes


Psychiatric:  Yes


Reproductive:  No


Respiratory:  No


Migraines:  Yes


Myocardial Infarction:  No


Radiation Therapy:  No


Renal Failure:  No


Seizures:  No


Sickle Cell Disease:  No


Sleep Apnea:  No


Ulcer:  No





Past Surgical History


Abdominal Surgery:  Yes


AICD:  No


Appendectomy:  Yes


Arteriovenous Shunt:  No


Body Medical Devices:  unknown


Cardiac Surgery:  No


Cholecystectomy:  No


Coronary Artery Bypass Graft:  No


Ear Surgery:  No


Eye Surgery:  No


Genitourinary Surgery:  Yes (TESTICLE)


Gynecologic Surgery:  No


Insulin Pump:  No


Joint Replacement:  No


Neurologic Surgery:  Yes (BACK FX REPAIR)


Oral Surgery:  No


Pacemaker:  No


Thoracic Surgery:  No


Tonsillectomy:  Yes


Other Surgery:  Yes (skingraft)





Family History


Family Myocardial Infarction:  Yes (FATHER MI)





Social History


Alcohol Use:  No (HX OF USE)


Tobacco Use:  Yes (1 PACK DAILY)


Substance Use:  Yes (MARIJUANA)





Allergies-Medications


(Allergen,Severity, Reaction):  


Coded Allergies:  


     codeine (Unverified  Adverse Reaction, Severe, NAUSEA & VOMITING, 9/4/17)


     *MDRO Multi-Drug Resistant Organism (Verified  Adverse Reaction, Unknown, 9 /4/17)


 MRSA 2007 (finger), 1/2008 (hand), 3/2008 (finger), 11/2011


 (scrotum), 10/2014 (arm), 10/2015 (arm)


Reported Meds & Prescriptions





Reported Meds & Active Scripts


Active


Reported


Clopidogrel (Clopidogrel Bisulfate) 75 Mg Tab 75 Mg PO DAILY


Amitriptyline (Amitriptyline HCl) 25 Mg Tab 25 Mg PO HS


Proventil Hfa 6.7 GM Inh (Albuterol Sulfate) 90 Mcg/Act Aer 1 Puff INH Q4-6H PRN


Flexeril (Cyclobenzaprine HCl) 5 Mg Tab 5 Mg PO TID


Metoprolol Tartrate 25 Mg Tab 25 Mg PO BID


Lansoprazole 15 Mg Capdr 15 Mg PO DAILY


Effexor (Venlafaxine HCl) 75 Mg Tab 75 Mg PO DAILY


Lisinopril 20 Mg Tab 20 Mg PO DAILY


Gabapentin 300 Mg Cap 600 Mg PO BID








Review of Systems


General / Constitutional:  No: Fever, Chills


Eyes:  No: Visual changes


HENT:  No: Headaches


Cardiovascular:  No: Chest Pain or Discomfort


Respiratory:  Positive: Cough, Shortness of Breath, Wheezing


Gastrointestinal:  Positive: Nausea, Vomiting, No: Diarrhea, Abdominal Pain, 

Constipation


Genitourinary:  No: Dysuria


Musculoskeletal:  No: Pain


Skin:  No Rash


Neurologic:  No: Weakness


Psychiatric:  No: Depression


Endocrine:  No: Polydipsia


Hematologic/Lymphatic:  No: Easy Bruising





Physical Exam


Narrative


GENERAL: mild distress


SKIN: Focused skin assessment warm/dry.


HEAD: Atraumatic. Normocephalic. 


EYES: Pupils equal and round. No scleral icterus. No injection or drainage. 


ENT: No nasal bleeding or discharge.  Mucous membranes pink and moist.


NECK: Trachea midline. No JVD. 


CARDIOVASCULAR: Regular rate and rhythm.  No murmur appreciated.


RESPIRATORY: No accessory muscle use.  Patient with scattered wheezing to upper 

and lower lobes the lungs.


GASTROINTESTINAL: Abdomen soft, non-tender, nondistended. Hepatic and splenic 

margins not palpable. 


MUSCULOSKELETAL: No obvious deformities. No clubbing.  No cyanosis.  No edema. 


NEUROLOGICAL: Awake and alert. No obvious cranial nerve deficits.  Motor 

grossly within normal limits. Normal speech.


PSYCHIATRIC: Appropriate mood and affect; insight and judgment normal.





Data


Data


Last Documented VS





Vital Signs








  Date Time  Temp Pulse Resp B/P (MAP) Pulse Ox O2 Delivery O2 Flow Rate FiO2


 


9/4/17 12:15     100   21


 


9/4/17 11:49      Nasal Cannula 2.00 


 


9/4/17 11:40 98.0 77 28     








Orders





 Orders


Complete Blood Count With Diff (9/4/17 11:46)


Comprehensive Metabolic Panel (9/4/17 11:46)


B-Type Natriuretic Peptide (9/4/17 11:46)


Act Partial Throm Time (Ptt) (9/4/17 11:46)


Prothrombin Time / Inr (Pt) (9/4/17 11:46)


Magnesium (Mg) (9/4/17 11:46)


Ckmb (Isoenzyme) Profile (9/4/17 11:46)


Troponin I (9/4/17 11:46)


Arterial Blood Gas (Abg) (9/4/17 11:46)


Urinalysis - C+S If Indicated (9/4/17 11:46)


Blood Culture (9/4/17 11:46)


Iv Access Insert/Monitor (9/4/17 11:46)


Electrocardiogram (9/4/17 11:46)


Ecg Monitoring (9/4/17 11:46)


Oximetry (9/4/17 11:46)


Chest, Single Ap (9/4/17 11:46)


Sodium Chloride 0.9% Flush (Ns Flush) (9/4/17 12:00)


Methylprednisolone So Succ Inj (Solumedr (9/4/17 12:00)


Albuterol-Ipratropium Neb (Duoneb Neb) (9/4/17 12:00)


Lactic Acid Sepsis Protocol (9/4/17 11:48)


Sodium Chlor 0.9% 1000 Ml Inj (Ns 1000 M (9/4/17 12:00)


Ondansetron Inj (Zofran Inj) (9/4/17 12:00)


Urinary Catheter Insert/Apply (9/4/17 12:45)


Admit To Inpatient (9/4/17 )


Vital Signs (Adult) Q4H (9/4/17 14:32)


Activity Oob With Assistance (9/4/17 14:32)


Intake + Output DEENA.QSHIFT (9/4/17 14:32)


Diet Regular Basic (9/4/17 Dinner)


Sodium Chlor 0.9% 1000 Ml Inj (Ns 1000 M (9/4/17 15:00)


Sodium Chloride 0.9% Flush (Ns Flush) (9/4/17 21:00)


Ondansetron Inj (Zofran Inj) (9/4/17 14:45)


Comprehensive Metabolic Panel (9/5/17 06:00)


Complete Blood Count With Diff (9/5/17 06:00)


Pt Request For Service (9/4/17 14:32)


Case Management Consult (9/4/17 14:32)


Heparin Inj (Heparin Inj) (9/4/17 15:00)


Scd Bilateral/Knee High DEENA.BID (9/4/17 14:32)


Naloxone Inj (Narcan Inj) (9/4/17 14:45)


Docusate Sodium-Senna (Patti-Colace) (9/4/17 21:00)


Magnesium Hydroxide Liq (Milk Of Magnesi (9/4/17 14:45)


Sennosides (Senokot) (9/4/17 14:45)


Bisacodyl Supp (Dulcolax Supp) (9/4/17 14:45)


Lactulose Liq (Lactulose Liq) (9/4/17 14:45)


Inpatient Certification (9/4/17 )


Creatine Kinase (Cpk) (9/4/17 14:36)


Sodium, Random Urine (9/4/17 14:36)


Creatinine, Random Urine (9/4/17 14:36)


Consult Nephrology (9/4/17 )


Us Kidney/Renal/Bladder (9/4/17 )


Clopidogrel (Plavix) (9/5/17 09:00)


Metoprolol Tartrate (Lopressor) (9/4/17 21:00)


Pantoprazole (Protonix) (9/5/17 09:00)


Admit Order (Ed Use Only) (9/4/17 14:39)


Albuterol Hfa Inh (Ventolin Hfa Inh) (9/4/17 15:00)





Labs





Laboratory Tests








Test


  9/4/17


11:45 9/4/17


12:20 9/4/17


13:00


 


White Blood Count 10.6 TH/MM3   


 


Red Blood Count 4.08 MIL/MM3   


 


Hemoglobin 12.6 GM/DL   


 


Hematocrit 36.8 %   


 


Mean Corpuscular Volume 90.2 FL   


 


Mean Corpuscular Hemoglobin 30.8 PG   


 


Mean Corpuscular Hemoglobin


Concent 34.1 % 


  


  


 


 


Red Cell Distribution Width 14.0 %   


 


Platelet Count 325 TH/MM3   


 


Mean Platelet Volume 8.2 FL   


 


Neutrophils (%) (Auto) 54.5 %   


 


Lymphocytes (%) (Auto) 27.5 %   


 


Monocytes (%) (Auto) 14.2 %   


 


Eosinophils (%) (Auto) 3.2 %   


 


Basophils (%) (Auto) 0.6 %   


 


Neutrophils # (Auto) 5.8 TH/MM3   


 


Lymphocytes # (Auto) 2.9 TH/MM3   


 


Monocytes # (Auto) 1.5 TH/MM3   


 


Eosinophils # (Auto) 0.3 TH/MM3   


 


Basophils # (Auto) 0.1 TH/MM3   


 


CBC Comment DIFF FINAL   


 


Differential Comment    


 


Prothrombin Time 9.6 SEC   


 


Prothromb Time International


Ratio 0.9 RATIO 


  


  


 


 


Activated Partial


Thromboplast Time 28.8 SEC 


  


  


 


 


Blood Urea Nitrogen 78 MG/DL   


 


Creatinine 6.13 MG/DL   


 


Random Glucose 93 MG/DL   


 


Total Protein 7.4 GM/DL   


 


Albumin 3.3 GM/DL   


 


Calcium Level 9.2 MG/DL   


 


Magnesium Level 2.9 MG/DL   


 


Alkaline Phosphatase 103 U/L   


 


Aspartate Amino Transf


(AST/SGOT) 12 U/L 


  


  


 


 


Alanine Aminotransferase


(ALT/SGPT) 16 U/L 


  


  


 


 


Total Bilirubin 0.4 MG/DL   


 


Sodium Level 129 MEQ/L   


 


Potassium Level 4.5 MEQ/L   


 


Chloride Level 86 MEQ/L   


 


Carbon Dioxide Level 35.2 MEQ/L   


 


Anion Gap 8 MEQ/L   


 


Estimat Glomerular Filtration


Rate 10 ML/MIN 


  


  


 


 


Lactic Acid Level 1.3 mmol/L   


 


Total Creatine Kinase 28 U/L   


 


Troponin I


  LESS THAN 0.02


NG/ML 


  


 


 


B-Type Natriuretic Peptide 10 PG/ML   


 


Blood Gas Puncture Site  LT BRACHIAL  


 


Blood Gas Patient Temperature  98.6  


 


Blood Gas HCO3  33 mmol/L  


 


Blood Gas Base Excess  8.9 mmol/L  


 


Blood Gas Oxygen Saturation  85 %  


 


Arterial Blood pH  7.47  


 


Arterial Blood Partial


Pressure CO2 


  45 mmHg 


  


 


 


Arterial Blood Partial


Pressure O2 


  61 mmHG 


  


 


 


Arterial Blood Oxygen Content  13.2 Vol %  


 


Arterial Blood


Carboxyhemoglobin 


  8.4 % 


  


 


 


Arterial Blood Methemoglobin  0.7 %  


 


Blood Gas Hemoglobin  11.1 G/DL  


 


Oxygen Delivery Device  RA  


 


Urine Color   LIGHT-YELLOW 


 


Urine Turbidity   CLEAR 


 


Urine pH   7.5 


 


Urine Specific Gravity   1.008 


 


Urine Protein   TRACE mg/dL 


 


Urine Glucose (UA)   NEG mg/dL 


 


Urine Ketones   NEG mg/dL 


 


Urine Occult Blood   TRACE 


 


Urine Nitrite   NEG 


 


Urine Bilirubin   NEG 


 


Urine Urobilinogen


  


  


  LESS THAN 2.0


MG/DL


 


Urine Leukocyte Esterase   NEG 


 


Urine RBC   1 /hpf 


 


Urine WBC


  


  


  LESS THAN 1


/hpf


 


Microscopic Urinalysis Comment


  


  


  CULT NOT


INDICATED











MDM


Medical Decision Making


Medical Screen Exam Complete:  Yes


Emergency Medical Condition:  Yes


Interpretation(s)


EKG at 1150: NSR at 75bpm, qt/qtc: 341/370, no acute st or t wave changes








Vital Signs








  Date Time  Temp Pulse Resp B/P (MAP) Pulse Ox O2 Delivery O2 Flow Rate FiO2


 


9/4/17 11:49     100 Nasal Cannula 2.00 


 


9/4/17 11:40 98.0 77 28 118/66 (83)  Room Air  


 


9/4/17 11:27 98.1 86 28 117/60 (79) 89 Room Air  








Differential Diagnosis


Differential includes COPD exacerbation, pneumonia, arrhythmia, ACS, 

gastroenteritis, electrolyte abnormality, tobacco abuse


Narrative Course


Patient is a 52-year-old male who presents to emergency room complaints of not 

feeling well since last night.  Symptoms began last night with nausea and 

vomiting 1, reports no abdominal pain or nausea this morning.  Patient reports 

that this morning, he was only able to smoke 2 cigarettes, he has been feeling 

short of breath and has been coughing and wheezing.  





Patient was placed on a cardiac monitor upon arrival to the emergency room.  

EKG was obtained, no acute ST-T wave changes.  X-ray of the chest ordered, lab 

work ordered including blood cultures ordered as patient has increased 

respiratory rate and hypoxia. His oxygen saturation is 89% on room air.   

Patient is wheezing on exam and does have history of COPD.  IV steroids as well 

as neb treatments ordered.  





CBC & BMP Diagram


9/4/17 11:45





Laboratory Tests








Test


  9/4/17


11:45 9/4/17


12:20


 


White Blood Count


  10.6 TH/MM3


(4.0-11.0) 


 


 


Red Blood Count


  4.08 MIL/MM3


(4.50-5.90) 


 


 


Hemoglobin


  12.6 GM/DL


(13.0-17.0) 


 


 


Hematocrit


  36.8 %


(39.0-51.0) 


 


 


Mean Corpuscular Volume


  90.2 FL


(80.0-100.0) 


 


 


Mean Corpuscular Hemoglobin


  30.8 PG


(27.0-34.0) 


 


 


Mean Corpuscular Hemoglobin


Concent 34.1 %


(32.0-36.0) 


 


 


Red Cell Distribution Width


  14.0 %


(11.6-17.2) 


 


 


Platelet Count


  325 TH/MM3


(150-450) 


 


 


Mean Platelet Volume


  8.2 FL


(7.0-11.0) 


 


 


Neutrophils (%) (Auto)


  54.5 %


(16.0-70.0) 


 


 


Lymphocytes (%) (Auto)


  27.5 %


(9.0-44.0) 


 


 


Monocytes (%) (Auto)


  14.2 %


(0.0-8.0) 


 


 


Eosinophils (%) (Auto)


  3.2 %


(0.0-4.0) 


 


 


Basophils (%) (Auto)


  0.6 %


(0.0-2.0) 


 


 


Neutrophils # (Auto)


  5.8 TH/MM3


(1.8-7.7) 


 


 


Lymphocytes # (Auto)


  2.9 TH/MM3


(1.0-4.8) 


 


 


Monocytes # (Auto)


  1.5 TH/MM3


(0-0.9) 


 


 


Eosinophils # (Auto)


  0.3 TH/MM3


(0-0.4) 


 


 


Basophils # (Auto)


  0.1 TH/MM3


(0-0.2) 


 


 


CBC Comment DIFF FINAL  


 


Differential Comment   


 


Prothrombin Time


  9.6 SEC


(9.8-11.6) 


 


 


Prothromb Time International


Ratio 0.9 RATIO 


  


 


 


Activated Partial


Thromboplast Time 28.8 SEC


(24.3-30.1) 


 


 


Blood Urea Nitrogen


  78 MG/DL


(7-18) 


 


 


Creatinine


  6.13 MG/DL


(0.60-1.30) 


 


 


Random Glucose


  93 MG/DL


() 


 


 


Albumin


  3.3 GM/DL


(3.4-5.0) 


 


 


Calcium Level


  9.2 MG/DL


(8.5-10.1) 


 


 


Magnesium Level


  2.9 MG/DL


(1.5-2.5) 


 


 


Aspartate Amino Transf


(AST/SGOT) 12 U/L (15-37) 


  


 


 


Alanine Aminotransferase


(ALT/SGPT) 16 U/L (12-78) 


  


 


 


Sodium Level


  129 MEQ/L


(136-145) 


 


 


Potassium Level


  4.5 MEQ/L


(3.5-5.1) 


 


 


Chloride Level


  86 MEQ/L


() 


 


 


Carbon Dioxide Level


  35.2 MEQ/L


(21.0-32.0) 


 


 


Anion Gap 8 MEQ/L (5-15)  


 


Estimat Glomerular Filtration


Rate 10 ML/MIN


(>89) 


 


 


Lactic Acid Level


  1.3 mmol/L


(0.4-2.0) 


 


 


Blood Gas Puncture Site  LT BRACHIAL 


 


Blood Gas Patient Temperature  98.6 


 


Blood Gas HCO3


  


  33 mmol/L


(22-26)


 


Blood Gas Base Excess


  


  8.9 mmol/L


(-2-2)


 


Blood Gas Oxygen Saturation  85 % () 


 


Arterial Blood pH


  


  7.47


(7.380-7.420)


 


Arterial Blood Partial


Pressure CO2 


  45 mmHg


(38-42)


 


Arterial Blood Partial


Pressure O2 


  61 mmHG


()


 


Arterial Blood Oxygen Content


  


  13.2 Vol %


(12.0-20.0)


 


Arterial Blood


Carboxyhemoglobin 


  8.4 % (0-4) 


 


 


Arterial Blood Methemoglobin  0.7 % (0-2) 


 


Blood Gas Hemoglobin


  


  11.1 G/DL


(12.0-16.0)


 


Oxygen Delivery Device  RA 








patient hypoxic with o2 sat of 85% on room air, will put patient on 2L NC  of 

oxygen





bun 78/ cr 6.13 - patient with acute renal failure,  cr on 10/9/15 was 0.87.  

Trejo catheter ordered to monitor I's and O's





Patient will require admission to the hospital





Case reviewed with Dr. Son who accepts pt to service


Critical Care Narrative


Aggregate critical care time was 30 minutes. Time to perform other separately 

billable procedures was not  


included in the critical care time. My time did not include minutes spent 

treating any other patients simultaneously or on  


activities that did not directly contribute to the patient's treatment.  





The services I provided to this patient were to treat and/or prevent clinically 

significant deterioration that could result  


in:  death, decompensation, deterioration





I provided critical care services requiring my management, as noted below:


Chart data review, documentation time, medication orders and management, vital 

sign assessments/reviewing monitor data,  


ordering and reviewing lab tests, ordering and interpreting/reviewing x-rays 

and diagnostic studies, care of the patient  


and discussion of the patient with the admitting physicians.





Diagnosis





 Primary Impression:  


 Hypoxia


 Additional Impressions:  


 COPD exacerbation


 Renal failure





Admitting Information


Admitting Physician Requests:  Admit











Marisa Mathias DO Sep 4, 2017 12:01 room air